# Patient Record
Sex: MALE | Race: WHITE | NOT HISPANIC OR LATINO | Employment: FULL TIME | URBAN - METROPOLITAN AREA
[De-identification: names, ages, dates, MRNs, and addresses within clinical notes are randomized per-mention and may not be internally consistent; named-entity substitution may affect disease eponyms.]

---

## 2018-08-13 ENCOUNTER — HOSPITAL ENCOUNTER (EMERGENCY)
Facility: CLINIC | Age: 48
Discharge: HOME | End: 2018-08-13
Attending: EMERGENCY MEDICINE | Admitting: EMERGENCY MEDICINE
Payer: COMMERCIAL

## 2018-08-13 VITALS
SYSTOLIC BLOOD PRESSURE: 130 MMHG | OXYGEN SATURATION: 95 % | WEIGHT: 175 LBS | DIASTOLIC BLOOD PRESSURE: 73 MMHG | RESPIRATION RATE: 13 BRPM | TEMPERATURE: 98.3 F

## 2018-08-13 DIAGNOSIS — I49.1 PAC (PREMATURE ATRIAL CONTRACTION): ICD-10-CM

## 2018-08-13 DIAGNOSIS — I49.3 PVC'S (PREMATURE VENTRICULAR CONTRACTIONS): ICD-10-CM

## 2018-08-13 LAB
ANION GAP SERPL CALCULATED.3IONS-SCNC: 6 MMOL/L (ref 3–14)
BASOPHILS # BLD AUTO: 0 10E9/L (ref 0–0.2)
BASOPHILS NFR BLD AUTO: 0.5 %
BUN SERPL-MCNC: 18 MG/DL (ref 7–30)
CALCIUM SERPL-MCNC: 8.9 MG/DL (ref 8.5–10.1)
CHLORIDE SERPL-SCNC: 104 MMOL/L (ref 94–109)
CO2 SERPL-SCNC: 28 MMOL/L (ref 20–32)
CREAT SERPL-MCNC: 0.99 MG/DL (ref 0.66–1.25)
DIFFERENTIAL METHOD BLD: NORMAL
EOSINOPHIL # BLD AUTO: 0.1 10E9/L (ref 0–0.7)
EOSINOPHIL NFR BLD AUTO: 0.8 %
ERYTHROCYTE [DISTWIDTH] IN BLOOD BY AUTOMATED COUNT: 11.6 % (ref 10–15)
GFR SERPL CREATININE-BSD FRML MDRD: 81 ML/MIN/1.7M2
GLUCOSE SERPL-MCNC: 99 MG/DL (ref 70–99)
HCT VFR BLD AUTO: 48.4 % (ref 40–53)
HGB BLD-MCNC: 16.5 G/DL (ref 13.3–17.7)
IMM GRANULOCYTES # BLD: 0 10E9/L (ref 0–0.4)
IMM GRANULOCYTES NFR BLD: 0.2 %
INTERPRETATION ECG - MUSE: NORMAL
LYMPHOCYTES # BLD AUTO: 1.6 10E9/L (ref 0.8–5.3)
LYMPHOCYTES NFR BLD AUTO: 24.7 %
MAGNESIUM SERPL-MCNC: 2.4 MG/DL (ref 1.6–2.3)
MCH RBC QN AUTO: 29.9 PG (ref 26.5–33)
MCHC RBC AUTO-ENTMCNC: 34.1 G/DL (ref 31.5–36.5)
MCV RBC AUTO: 88 FL (ref 78–100)
MONOCYTES # BLD AUTO: 0.8 10E9/L (ref 0–1.3)
MONOCYTES NFR BLD AUTO: 12.3 %
NEUTROPHILS # BLD AUTO: 3.9 10E9/L (ref 1.6–8.3)
NEUTROPHILS NFR BLD AUTO: 61.5 %
NRBC # BLD AUTO: 0 10*3/UL
NRBC BLD AUTO-RTO: 0 /100
PLATELET # BLD AUTO: 163 10E9/L (ref 150–450)
POTASSIUM SERPL-SCNC: 3.7 MMOL/L (ref 3.4–5.3)
RBC # BLD AUTO: 5.52 10E12/L (ref 4.4–5.9)
SODIUM SERPL-SCNC: 138 MMOL/L (ref 133–144)
TSH SERPL DL<=0.005 MIU/L-ACNC: 2.5 MU/L (ref 0.4–4)
WBC # BLD AUTO: 6.4 10E9/L (ref 4–11)

## 2018-08-13 PROCEDURE — 93005 ELECTROCARDIOGRAM TRACING: CPT

## 2018-08-13 PROCEDURE — 25000128 H RX IP 250 OP 636: Performed by: EMERGENCY MEDICINE

## 2018-08-13 PROCEDURE — 83735 ASSAY OF MAGNESIUM: CPT | Performed by: EMERGENCY MEDICINE

## 2018-08-13 PROCEDURE — 99284 EMERGENCY DEPT VISIT MOD MDM: CPT | Mod: 25

## 2018-08-13 PROCEDURE — 84443 ASSAY THYROID STIM HORMONE: CPT | Performed by: EMERGENCY MEDICINE

## 2018-08-13 PROCEDURE — 80048 BASIC METABOLIC PNL TOTAL CA: CPT | Performed by: EMERGENCY MEDICINE

## 2018-08-13 PROCEDURE — 85025 COMPLETE CBC W/AUTO DIFF WBC: CPT | Performed by: EMERGENCY MEDICINE

## 2018-08-13 PROCEDURE — 96360 HYDRATION IV INFUSION INIT: CPT

## 2018-08-13 RX ADMIN — SODIUM CHLORIDE 1000 ML: 9 INJECTION, SOLUTION INTRAVENOUS at 05:13

## 2018-08-13 ASSESSMENT — ENCOUNTER SYMPTOMS
LIGHT-HEADEDNESS: 1
DIAPHORESIS: 1
PALPITATIONS: 1
FATIGUE: 1
SHORTNESS OF BREATH: 1
ABDOMINAL PAIN: 0

## 2018-08-13 NOTE — ED PROVIDER NOTES
History     Chief Complaint:  Palpitations       HPI   Gordy Collado is a 48 year old male who presents with palpitations. The patient states that last night at 1900 he started having palpitations described as his heart skipping some beats. He also had a pressure sensation in his chest that occurred with the irregular beats. The patient notes that the symptoms have been intermittent since and he called EMS at 0300. At that time, his symptoms improved, however, they returned and he notes that he last felt them on his way to the ED. The patient notes that during the episodes he also feels light headed, short of breath and diaphoretic. In the past, he has had similar episodes, however, they normally only last for short periods. The patient notes that he does normally go to the gym and does cardio and strength training 5 days a week. The last few times he went to the gym, he notes that he perhaps felt mildly fatigued.      Allergies:  No known drug allergies.     Medications:    Aspirin  Atenolol  Celexa     Past Medical History:    History reviewed. No pertinent past medical history.    Past Surgical History:    Shoulder surgery     Family History:    History reviewed. No pertinent family history.     Social History:  Marital Status: Single  Presents to the ED alone   Tobacco Use: Negative  Alcohol Use: Negative    Review of Systems   Constitutional: Positive for diaphoresis and fatigue.   Respiratory: Positive for shortness of breath.    Cardiovascular: Positive for chest pain and palpitations.   Gastrointestinal: Negative for abdominal pain.   Neurological: Positive for light-headedness.   All other systems reviewed and are negative.    Physical Exam   First Vitals:  BP: 142/79  Heart Rate: 55  Temp: 98.3  F (36.8  C)  Resp: 16  Weight: 79.4 kg (175 lb)  SpO2: 100 %      Physical Exam  Constitutional: Well developed, nontox appearance  Head: Atraumatic.   Mouth/Throat: Oropharynx is clear and moist.   Neck:  no  stridor  Eyes: no scleral icterus  Cardiovascular: Reg bradycardia, 2+ bilat radial pulses  Pulmonary/Chest: nml resp effort, Clear BS bilat  Abdominal: ND, +BS, soft, NT, no rebound or guarding   Ext: Warm, well perfused, no edema  Neurological: A&O, symmetric facies, moves ext x4  Skin: Skin is warm and dry.   Psychiatric: Behavior is normal. Thought content normal.   Nursing note and vitals reviewed.        Emergency Department Course   ECG:  @ 0446  Indication: Palpitations   Vent. Rate 58 bpm. IA interval 164 ms. QRS duration 94 ms. QT/QTc 414/406 ms. P-R-T axis 64 77 65.   Sinus bradycardia with premature atrial complexes. Otherwise normal ECG.   Read @ 0450 by Dr. Santo.     Laboratory:  Magnesium: 2.4 (H)   TSH: 2.50  BMP: WNL (Creatinine 0.99)   CBC:  WBC 6.4, HGB 16.5, , otherwise WNL     Interventions:  (0513) Normal Saline, 1 liter, IV bolus     Emergency Department Course:  Nursing notes and vitals reviewed.  (0450) I performed an exam of the patient as documented above.    EKG was done, interpretation as above.   A peripheral IV was established. Blood was drawn from the patient. This was sent for laboratory testing, findings above.    Findings and plan explained to the patient. Patient discharged home with instructions regarding supportive care, medications, and reasons to return. The importance of close follow-up was reviewed.     Impression & Plan      Medical Decision Making:    Gordy Collado is a 48 year old male who presents for evaluation of palpitations.  Initial ECG shows NSR w/ PACs, cardiac monitor shows PACs and PVCs.    Pt is symptomatic during these.  Doubtg SVT, Atrial fibrillation, ventricular arrhythmia.  DDx includes thyroid disease, acute electrolyte abnormality.  Pt denies new meds, caffeine or etoh.  Has had similar episodes in the past for which he has worn an event monitor.  Offered another event monitor which the pt declined.  Labs unremarkable other than minimal  hypermagnesemia.   Pt is stable for DC.     Doubt PE as patient is PERC negative and symptoms are atypical.  Doubt acute coronary syndrome given symptoms and exam.  Recommendations given regarding follow up with primary care doctor and return to the emergency department as needed for new or worsening symptoms.  Counseled on all results, disposition and diagnosis.  Pt understanding and agreeable to plan. Patient discharged in stable condition.        Diagnosis:    ICD-10-CM    1. PAC (premature atrial contraction) I49.1    2. PVC's (premature ventricular contractions) I49.3        Disposition:  discharged to home        IBelinda, am serving as a scribe on 8/13/2018 at 4:50 AM to personally document services performed by Dr. Santo based on my observations and the provider's statements to me.    8/13/2018   [facility] EMERGENCY DEPARTMENT       Iam Santo MD  08/13/18 1704       Iam Santo MD  08/13/18 2731

## 2018-08-13 NOTE — ED AVS SNAPSHOT
[facility] Emergency Department    [address]    [facility] [address]    Phone:  [phone]    Fax:  [phone]                                       Gordy Collado   MRN: [id number]    Department:  [facility] Emergency Department   Date of Visit:  8/13/2018           After Visit Summary Signature Page     I have received my discharge instructions, and my questions have been answered. I have discussed any challenges I see with this plan with the nurse or doctor.    ..........................................................................................................................................  Patient/Patient Representative Signature      ..........................................................................................................................................  Patient Representative Print Name and Relationship to Patient    ..................................................               ................................................  Date                                            Time    ..........................................................................................................................................  Reviewed by Signature/Title    ...................................................              ..............................................  Date                                                            Time

## 2018-08-13 NOTE — DISCHARGE INSTRUCTIONS
Discharge Instructions  Palpitations    Palpitations are an unusual awareness of your heartbeat. People often describe this as the heart skipping, fluttering, racing, irregular, or pounding. At this time, your provider has found no signs that your palpitations are due to a serious or life-threatening condition. However, sometimes there is a serious problem that does not show up right away.    Palpitations can be caused by caffeine, cigarettes, diet pills, energy drinks or supplements, other stimulants, and medications and street drugs. They can also be caused by anxiety, hormone conditions such as high thyroid, and other medical conditions. Sometimes they are a sign of abnormal rhythm in the heart. At this time, your provider did not find any dangerous cause of your symptoms.    Generally, every Emergency Department visit should have a follow-up clinic visit with either a primary or a specialty clinic/provider. Please follow-up as instructed by your emergency provider today.    Return to the Emergency Department if:    You get chest pain or tightness.    You are short of breath.    You get very weak or tired.    You pass out or faint.    Your heart rate is over 120 beats per minute for more than 10 minutes while you are resting.     You have anything else that worries you.    What can I do to help myself?    Fill any prescriptions the provider gave you and take them right away.     Follow your provider s instructions about the prescription medicines you are on. Sometimes the provider may tell you to stop taking a medicine or change the dose.    If you smoke, this may be a good time to quit! The less you can smoke, the better.    Do not use energy drinks, diet pills, or stimulants. Limit your use of caffeine.  If you were given a prescription for medicine here today, be sure to read all of the information (including the package insert) that comes with your prescription.  This will include important information  about the medicine, its side effects, and any warnings that you need to know about.  The pharmacist who fills the prescription can provide more information and answer questions you may have about the medicine.  If you have questions or concerns that the pharmacist cannot address, please call or return to the Emergency Department.     Remember that you can always come back to the Emergency Department if you are not able to see your regular provider in the amount of time listed above, if you get any new symptoms, or if there is anything that worries you.

## 2018-08-13 NOTE — ED TRIAGE NOTES
Alert and oriented x 3 airway,breathing and circulation intact, having bouts of irregular heartbeat and chest discomfort, hx of artrial fib

## 2018-08-13 NOTE — ED AVS SNAPSHOT
[facility] Emergency Department    [address]    [facility] [address]    Phone:  [phone]    Fax:  [phone]                                       Gordy Collado   MRN: [id number]    Department:  [facility] Emergency Department   Date of Visit:  8/13/2018           Patient Information     Date Of Birth          1970        Your diagnoses for this visit were:     PAC (premature atrial contraction)     PVC's (premature ventricular contractions)        You were seen by Iam Santo MD.      Follow-up Information     Follow up with [facility] Emergency Department.    Specialty:  EMERGENCY MEDICINE    Why:  As needed    Contact information:    [address] [facility] [address] [phone]        Discharge Instructions             Discharge Instructions  Palpitations    Palpitations are an unusual awareness of your heartbeat. People often describe this as the heart skipping, fluttering, racing, irregular, or pounding. At this time, your provider has found no signs that your palpitations are due to a serious or life-threatening condition. However, sometimes there is a serious problem that does not show up right away.    Palpitations can be caused by caffeine, cigarettes, diet pills, energy drinks or supplements, other stimulants, and medications and street drugs. They can also be caused by anxiety, hormone conditions such as high thyroid, and other medical conditions. Sometimes they are a sign of abnormal rhythm in the heart. At this time, your provider did not find any dangerous cause of your symptoms.    Generally, every Emergency Department visit should have a follow-up clinic visit with either a primary or a specialty clinic/provider. Please follow-up as instructed by your emergency provider today.    Return to the Emergency Department if:    You get chest pain or tightness.    You are short of breath.    You get very  weak or tired.    You pass out or faint.    Your heart rate is over 120 beats per minute for more than 10 minutes while you are resting.     You have anything else that worries you.    What can I do to help myself?    Fill any prescriptions the provider gave you and take them right away.     Follow your provider s instructions about the prescription medicines you are on. Sometimes the provider may tell you to stop taking a medicine or change the dose.    If you smoke, this may be a good time to quit! The less you can smoke, the better.    Do not use energy drinks, diet pills, or stimulants. Limit your use of caffeine.  If you were given a prescription for medicine here today, be sure to read all of the information (including the package insert) that comes with your prescription.  This will include important information about the medicine, its side effects, and any warnings that you need to know about.  The pharmacist who fills the prescription can provide more information and answer questions you may have about the medicine.  If you have questions or concerns that the pharmacist cannot address, please call or return to the Emergency Department.     Remember that you can always come back to the Emergency Department if you are not able to see your regular provider in the amount of time listed above, if you get any new symptoms, or if there is anything that worries you.      24 Hour Appointment Hotline       To make an appointment at any [facility], call [phone] ([phone]). If you don't have a family doctor or clinic, we will help you find one. [facility] are conveniently located to serve the needs of you and your family.             Review of your medicines      Our records show that you are taking the medicines listed below. If these are incorrect, please call your family doctor or clinic.        Dose / Directions Last dose taken    ASPIRIN PO   Dose:  81 mg        Take 81 mg by mouth    Refills:  0        ATENOLOL PO        Refills:  0        CELEXA PO        Refills:  0                Procedures and tests performed during your visit     Basic metabolic panel    CBC with platelets differential    EKG 12 lead    Magnesium    TSH with free T4 reflex      Orders Needing Specimen Collection     None      Pending Results     Date and Time Order Name Status Description    8/13/2018 0453 EKG 12 lead Preliminary             Pending Culture Results     No orders found from 8/11/2018 to 8/14/2018.            Pending Results Instructions     If you had any lab results that were not finalized at the time of your Discharge, you can call the ED Lab Result RN at [phone]. You will be contacted by this team for any positive Lab results or changes in treatment. The nurses are available 7 days a week from 10A to 6:30P.  You can leave a message 24 hours per day and they will return your call.        Test Results From Your Hospital Stay        8/13/2018  5:25 AM      Component Results     Component Value Ref Range & Units Status    WBC 6.4 4.0 - 11.0 10e9/L Final    RBC Count 5.52 4.4 - 5.9 10e12/L Final    Hemoglobin 16.5 13.3 - 17.7 g/dL Final    Hematocrit 48.4 40.0 - 53.0 % Final    MCV 88 78 - 100 fl Final    MCH 29.9 26.5 - 33.0 pg Final    MCHC 34.1 31.5 - 36.5 g/dL Final    RDW 11.6 10.0 - 15.0 % Final    Platelet Count 163 150 - 450 10e9/L Final    Diff Method Automated Method  Final    % Neutrophils 61.5 % Final    % Lymphocytes 24.7 % Final    % Monocytes 12.3 % Final    % Eosinophils 0.8 % Final    % Basophils 0.5 % Final    % Immature Granulocytes 0.2 % Final    Nucleated RBCs 0 0 /100 Final    Absolute Neutrophil 3.9 1.6 - 8.3 10e9/L Final    Absolute Lymphocytes 1.6 0.8 - 5.3 10e9/L Final    Absolute Monocytes 0.8 0.0 - 1.3 10e9/L Final    Absolute Eosinophils 0.1 0.0 - 0.7 10e9/L Final    Absolute Basophils 0.0 0.0 - 0.2 10e9/L Final    Abs Immature Granulocytes 0.0 0 - 0.4 10e9/L Final     Absolute Nucleated RBC 0.0  Final         8/13/2018  5:45 AM      Component Results     Component Value Ref Range & Units Status    Sodium 138 133 - 144 mmol/L Final    Potassium 3.7 3.4 - 5.3 mmol/L Final    Chloride 104 94 - 109 mmol/L Final    Carbon Dioxide 28 20 - 32 mmol/L Final    Anion Gap 6 3 - 14 mmol/L Final    Glucose 99 70 - 99 mg/dL Final    Urea Nitrogen 18 7 - 30 mg/dL Final    Creatinine 0.99 0.66 - 1.25 mg/dL Final    GFR Estimate 81 >60 mL/min/1.7m2 Final    Non  GFR Calc    GFR Estimate If Black >90 >60 mL/min/1.7m2 Final    African American GFR Calc    Calcium 8.9 8.5 - 10.1 mg/dL Final         8/13/2018  5:52 AM      Component Results     Component Value Ref Range & Units Status    TSH 2.50 0.40 - 4.00 mU/L Final         8/13/2018  5:45 AM      Component Results     Component Value Ref Range & Units Status    Magnesium 2.4 (H) 1.6 - 2.3 mg/dL Final                Clinical Quality Measure: Blood Pressure Screening     Your blood pressure was checked while you were in the emergency department today. The last reading we obtained was  BP: 142/79 . Please read the guidelines below about what these numbers mean and what you should do about them.  If your systolic blood pressure (the top number) is less than 120 and your diastolic blood pressure (the bottom number) is less than 80, then your blood pressure is normal. There is nothing more that you need to do about it.  If your systolic blood pressure (the top number) is 120-139 or your diastolic blood pressure (the bottom number) is 80-89, your blood pressure may be higher than it should be. You should have your blood pressure rechecked within a year by a primary care provider.  If your systolic blood pressure (the top number) is 140 or greater or your diastolic blood pressure (the bottom number) is 90 or greater, you may have high blood pressure. High blood pressure is treatable, but if left untreated over time it can put you at risk  "for heart attack, stroke, or kidney failure. You should have your blood pressure rechecked by a primary care provider within the next 4 weeks.  If your provider in the emergency department today gave you specific instructions to follow-up with your doctor or provider even sooner than that, you should follow that instruction and not wait for up to 4 weeks for your follow-up visit.        Thank you for choosing [facility]       Thank you for choosing [facility] for your care. Our goal is always to provide you with excellent care. Hearing back from our patients is one way we can continue to improve our services. Please take a few minutes to complete the written survey that you may receive in the mail after you visit with us. Thank you!        [employer] Information     [employer] lets you send messages to your doctor, view your test results, renew your prescriptions, schedule appointments and more. To sign up, go to [email or web address] . Click on \"Log in\" on the left side of the screen, which will take you to the Welcome page. Then click on \"Sign up Now\" on the right side of the page.     You will be asked to enter the access code listed below, as well as some personal information. Please follow the directions to create your username and password.     Your access code is: [email or web address]  Expires: 2018  6:19 AM     Your access code will  in 90 days. If you need help or a new code, please call your [facility] clinic or [phone].        Care EveryWhere ID     This is your Care EveryWhere ID. This could be used by other organizations to access your [facility] medical records  [id number]        Equal Access to Services     [facility] CenterDARIEL : Hadii nain sweet Sodante, waaxda luqadaha, qaybta kaalmacorby moe . So [facility] [phone].    ATENCIÓN: Si habla español, tiene a kendall disposición servicios gratuitos de asistencia lingüística. Llame al [phone].    We comply with " applicable federal civil rights laws and [place] laws. We do not discriminate on the basis of race, color, national origin, age, disability, sex, sexual orientation, or gender identity.            After Visit Summary       This is your record. Keep this with you and show to your community pharmacist(s) and doctor(s) at your next visit.

## 2024-01-18 ENCOUNTER — APPOINTMENT (OUTPATIENT)
Dept: GENERAL RADIOLOGY | Facility: CLINIC | Age: 54
End: 2024-01-18
Attending: EMERGENCY MEDICINE
Payer: COMMERCIAL

## 2024-01-18 ENCOUNTER — HOSPITAL ENCOUNTER (EMERGENCY)
Facility: CLINIC | Age: 54
Discharge: HOME | End: 2024-01-18
Attending: EMERGENCY MEDICINE | Admitting: EMERGENCY MEDICINE
Payer: COMMERCIAL

## 2024-01-18 VITALS
OXYGEN SATURATION: 100 % | RESPIRATION RATE: 11 BRPM | WEIGHT: 170 LBS | HEART RATE: 69 BPM | DIASTOLIC BLOOD PRESSURE: 57 MMHG | BODY MASS INDEX: 21.82 KG/M2 | TEMPERATURE: 97.9 F | SYSTOLIC BLOOD PRESSURE: 128 MMHG | HEIGHT: 74 IN

## 2024-01-18 DIAGNOSIS — R00.2 PALPITATIONS: ICD-10-CM

## 2024-01-18 DIAGNOSIS — R07.9 CHEST PAIN, UNSPECIFIED TYPE: ICD-10-CM

## 2024-01-18 LAB
ANION GAP SERPL CALCULATED.3IONS-SCNC: 10 MMOL/L (ref 7–15)
BASOPHILS # BLD AUTO: 0 10E3/UL (ref 0–0.2)
BASOPHILS NFR BLD AUTO: 0 %
BUN SERPL-MCNC: 27.7 MG/DL (ref 6–20)
CALCIUM SERPL-MCNC: 9.1 MG/DL (ref 8.6–10)
CHLORIDE SERPL-SCNC: 101 MMOL/L (ref 98–107)
CREAT SERPL-MCNC: 1.01 MG/DL (ref 0.67–1.17)
D DIMER PPP FEU-MCNC: <0.27 UG/ML FEU (ref 0–0.5)
DEPRECATED HCO3 PLAS-SCNC: 26 MMOL/L (ref 22–29)
EGFRCR SERPLBLD CKD-EPI 2021: 89 ML/MIN/1.73M2
EOSINOPHIL # BLD AUTO: 0.1 10E3/UL (ref 0–0.7)
EOSINOPHIL NFR BLD AUTO: 2 %
ERYTHROCYTE [DISTWIDTH] IN BLOOD BY AUTOMATED COUNT: 11.8 % (ref 10–15)
GLUCOSE SERPL-MCNC: 151 MG/DL (ref 70–99)
HCT VFR BLD AUTO: 43.4 % (ref 40–53)
HGB BLD-MCNC: 14.4 G/DL (ref 13.3–17.7)
IMM GRANULOCYTES # BLD: 0 10E3/UL
IMM GRANULOCYTES NFR BLD: 0 %
LYMPHOCYTES # BLD AUTO: 1.6 10E3/UL (ref 0.8–5.3)
LYMPHOCYTES NFR BLD AUTO: 30 %
MCH RBC QN AUTO: 29.4 PG (ref 26.5–33)
MCHC RBC AUTO-ENTMCNC: 33.2 G/DL (ref 31.5–36.5)
MCV RBC AUTO: 89 FL (ref 78–100)
MONOCYTES # BLD AUTO: 0.6 10E3/UL (ref 0–1.3)
MONOCYTES NFR BLD AUTO: 11 %
NEUTROPHILS # BLD AUTO: 3.2 10E3/UL (ref 1.6–8.3)
NEUTROPHILS NFR BLD AUTO: 57 %
NRBC # BLD AUTO: 0 10E3/UL
NRBC BLD AUTO-RTO: 0 /100
PLATELET # BLD AUTO: 156 10E3/UL (ref 150–450)
POTASSIUM SERPL-SCNC: 4.2 MMOL/L (ref 3.4–5.3)
RBC # BLD AUTO: 4.89 10E6/UL (ref 4.4–5.9)
SODIUM SERPL-SCNC: 137 MMOL/L (ref 135–145)
TROPONIN T SERPL HS-MCNC: 14 NG/L
TROPONIN T SERPL HS-MCNC: 9 NG/L
WBC # BLD AUTO: 5.6 10E3/UL (ref 4–11)

## 2024-01-18 PROCEDURE — 99285 EMERGENCY DEPT VISIT HI MDM: CPT | Mod: 25

## 2024-01-18 PROCEDURE — 80048 BASIC METABOLIC PNL TOTAL CA: CPT | Performed by: EMERGENCY MEDICINE

## 2024-01-18 PROCEDURE — 71046 X-RAY EXAM CHEST 2 VIEWS: CPT

## 2024-01-18 PROCEDURE — 93005 ELECTROCARDIOGRAM TRACING: CPT

## 2024-01-18 PROCEDURE — 85048 AUTOMATED LEUKOCYTE COUNT: CPT | Performed by: EMERGENCY MEDICINE

## 2024-01-18 PROCEDURE — 84484 ASSAY OF TROPONIN QUANT: CPT | Performed by: EMERGENCY MEDICINE

## 2024-01-18 PROCEDURE — 36415 COLL VENOUS BLD VENIPUNCTURE: CPT | Performed by: EMERGENCY MEDICINE

## 2024-01-18 PROCEDURE — 85379 FIBRIN DEGRADATION QUANT: CPT | Performed by: EMERGENCY MEDICINE

## 2024-01-18 ASSESSMENT — ACTIVITIES OF DAILY LIVING (ADL)
ADLS_ACUITY_SCORE: 35
ADLS_ACUITY_SCORE: 35

## 2024-01-18 NOTE — ED TRIAGE NOTES
Patient states he is getting frequent PVCs and chest pain started. Has had similar in the past, has been treated with medications. Mild SOB

## 2024-01-18 NOTE — ED PROVIDER NOTES
History     Chief Complaint:  Chest pain, palpitations       HPI   Gordy Collado is a 53 year old male with history of paroxysmal atrial fibrillation and PVCs who presents for evaluation of palpitations and chest discomfort.  Patient states that he occasionally feels palpitations that he attributes to PVCs, but was feeling them at an increased frequency today.  In addition to the palpitations, the patient noted a dull chest discomfort.  He denies shortness of breath, pleuritic discomfort, cough, fever, or any other concerns.    Independent Historian:   None    Review of External Notes: Reviewed 10/12/2023 office visit    ROS:  Review of Systems    Allergies:  No Known Allergies     Medications:    ASPIRIN PO  ATENOLOL PO  Citalopram Hydrobromide (CELEXA PO)        Past History:    Past Medical History:   Diagnosis Date    Atrial fibrillation (H)     Sleep apnea          Physical Exam     Patient Vitals for the past 24 hrs:   BP Temp Temp src Pulse Resp SpO2   05/12/23 0247 113/73 97.8  F (36.6  C) Temporal 95 20 98 %        Physical Exam  Constitutional: Alert, attentive  HENT:    Nose: Nose normal.    Mouth/Throat: Oropharynx is clear, mucous membranes are moist   Eyes: EOM are normal.   CV: regular rate and rhythm; no murmurs, rubs or gallups  Chest: Effort normal and breath sounds normal.   GI:  There is no tenderness. No distension. Normal bowel sounds  MSK: Normal range of motion.   Neurological: Alert, attentive  Skin: Skin is warm and dry.      Emergency Department Course       Results for orders placed or performed during the hospital encounter of 01/18/24   XR Chest 2 Views     Status: None    Narrative    CHEST TWO VIEWS  1/18/2024 3:55 PM     HISTORY: Chest pain.    COMPARISON: None.       Impression    IMPRESSION:  There are no acute infiltrates. The cardiac silhouette is  not enlarged. Pulmonary vasculature is unremarkable.     MCKAYLA MILLER MD         SYSTEM ID:  [id number]   Basic metabolic panel  (BMP)     Status: Abnormal   Result Value Ref Range    Sodium 137 135 - 145 mmol/L    Potassium 4.2 3.4 - 5.3 mmol/L    Chloride 101 98 - 107 mmol/L    Carbon Dioxide (CO2) 26 22 - 29 mmol/L    Anion Gap 10 7 - 15 mmol/L    Urea Nitrogen 27.7 (H) 6.0 - 20.0 mg/dL    Creatinine 1.01 0.67 - 1.17 mg/dL    GFR Estimate 89 >60 mL/min/1.73m2    Calcium 9.1 8.6 - 10.0 mg/dL    Glucose 151 (H) 70 - 99 mg/dL   Troponin T, High Sensitivity (now)     Status: Normal   Result Value Ref Range    Troponin T, High Sensitivity 9 <=22 ng/L   D dimer quantitative     Status: Normal   Result Value Ref Range    D-Dimer Quantitative <0.27 0.00 - 0.50 ug/mL FEU    Narrative    This D-dimer assay is intended for use in conjunction with a clinical pretest probability assessment model to exclude pulmonary embolism (PE) and deep venous thrombosis (DVT) in outpatients suspected of PE or DVT. The cut-off value is 0.50 ug/mL FEU.    For patients 50 years of age or older, the application of age-adjusted cut-off values for D-Dimer may increase the specificity without significant effect on sensitivity. The literature suggested calculation age adjusted cut-off in ug/L = age in years x 10 ug/L. The results in this laboratory are reported as ug/mL rather than ug/L. The calculation for age adjusted cut off in ug/mL= age in years x 0.01 ug/mL. For example, the cut off for a 76 year old male is 76 x 0.01 ug/mL = 0.76 ug/mL (760 ug/L).    M Becky et al. Age adjusted D-dimer cut-off levels to rule out pulmonary embolism: The ADJUST-PE Study. SHELBI 2014;311:1423-2276.; TENA Aquino et al. Diagnostic accuracy of conventional or age adjusted D-dimer cutoff values in older patients with suspected venous thromboembolism. Systemic review and meta-analysis. BMJ 2013:346:f2492.   CBC with platelets and differential     Status: None   Result Value Ref Range    WBC Count 5.6 4.0 - 11.0 10e3/uL    RBC Count 4.89 4.40 - 5.90 10e6/uL    Hemoglobin 14.4 13.3 - 17.7 g/dL     Hematocrit 43.4 40.0 - 53.0 %    MCV 89 78 - 100 fL    MCH 29.4 26.5 - 33.0 pg    MCHC 33.2 31.5 - 36.5 g/dL    RDW 11.8 10.0 - 15.0 %    Platelet Count 156 150 - 450 10e3/uL    % Neutrophils 57 %    % Lymphocytes 30 %    % Monocytes 11 %    % Eosinophils 2 %    % Basophils 0 %    % Immature Granulocytes 0 %    NRBCs per 100 WBC 0 <1 /100    Absolute Neutrophils 3.2 1.6 - 8.3 10e3/uL    Absolute Lymphocytes 1.6 0.8 - 5.3 10e3/uL    Absolute Monocytes 0.6 0.0 - 1.3 10e3/uL    Absolute Eosinophils 0.1 0.0 - 0.7 10e3/uL    Absolute Basophils 0.0 0.0 - 0.2 10e3/uL    Absolute Immature Granulocytes 0.0 <=0.4 10e3/uL    Absolute NRBCs 0.0 10e3/uL   EKG 12 lead     Status: None (Preliminary result)   Result Value Ref Range    Systolic Blood Pressure  mmHg    Diastolic Blood Pressure  mmHg    Ventricular Rate 72 BPM    Atrial Rate 72 BPM    VA Interval 178 ms    QRS Duration 94 ms     ms    QTc 431 ms    P Axis 69 degrees    R AXIS 74 degrees    T Axis 63 degrees    Interpretation ECG       Sinus rhythm  Normal ECG  When compared with ECG of 13-AUG-2018 04:46,  Premature atrial complexes are no longer Present     CBC + differential     Status: None    Narrative    The following orders were created for panel order CBC + differential.  Procedure                               Abnormality         Status                     ---------                               -----------         ------                     CBC with platelets and d...[[id number]]                      Final result                 Please view results for these tests on the individual orders.       Emergency Department Course & Assessments:           Independent Interpretation (X-rays, CTs, rhythm strip):  No wide mediastinum on chest x-ray      Disposition:  The patient was discharged to home.     Impression & Plan      Medical Decision Making:  This is a 53-year-old male with history of PVCs and paroxysmal A-fib who presents for ration of palpitations  and chest pain.  D-dimer is negative, since ruling out PE.  There is no hematologic or metabolic abnormality to explain his palpitation symptoms, and no abnormalities noted on EKG or monitoring.  His EKG is nonischemic and delta 2-hour troponins are negative, essentially ruling out AMI.  On recheck, the patient's symptoms are resolved.  Given his low  HEART score, he is a reasonable candidate for outpatient follow-up.  Plan primary care follow-up for recheck in 1 week.  Return precautions for chest pain, shortness of breath, or any other concerns.        Diagnosis:  Visit Diagnosis, Associated Orders, and Comments     ICD-10-CM    1. Chest pain, unspecified type  R07.9       2. Palpitations  R00.2              Ady Pham MD  01/18/24 1858

## 2024-01-19 ENCOUNTER — HOSPITAL ENCOUNTER (EMERGENCY)
Facility: CLINIC | Age: 54
Discharge: HOME | End: 2024-01-19
Attending: EMERGENCY MEDICINE | Admitting: EMERGENCY MEDICINE
Payer: COMMERCIAL

## 2024-01-19 VITALS
BODY MASS INDEX: 21.82 KG/M2 | OXYGEN SATURATION: 96 % | SYSTOLIC BLOOD PRESSURE: 121 MMHG | TEMPERATURE: 96 F | DIASTOLIC BLOOD PRESSURE: 71 MMHG | HEIGHT: 74 IN | WEIGHT: 170 LBS | RESPIRATION RATE: 12 BRPM | HEART RATE: 57 BPM

## 2024-01-19 DIAGNOSIS — I49.3 SYMPTOMATIC PVCS: ICD-10-CM

## 2024-01-19 LAB
ATRIAL RATE - MUSE: 61 BPM
ATRIAL RATE - MUSE: 72 BPM
ATRIAL RATE - MUSE: 72 BPM
DIASTOLIC BLOOD PRESSURE - MUSE: NORMAL MMHG
INTERPRETATION ECG - MUSE: NORMAL
P AXIS - MUSE: 30 DEGREES
P AXIS - MUSE: 65 DEGREES
P AXIS - MUSE: 69 DEGREES
PR INTERVAL - MUSE: 116 MS
PR INTERVAL - MUSE: 174 MS
PR INTERVAL - MUSE: 178 MS
QRS DURATION - MUSE: 92 MS
QRS DURATION - MUSE: 94 MS
QRS DURATION - MUSE: 94 MS
QT - MUSE: 394 MS
QT - MUSE: 426 MS
QT - MUSE: 428 MS
QTC - MUSE: 428 MS
QTC - MUSE: 431 MS
QTC - MUSE: 468 MS
R AXIS - MUSE: 41 DEGREES
R AXIS - MUSE: 73 DEGREES
R AXIS - MUSE: 74 DEGREES
SYSTOLIC BLOOD PRESSURE - MUSE: NORMAL MMHG
T AXIS - MUSE: 39 DEGREES
T AXIS - MUSE: 53 DEGREES
T AXIS - MUSE: 63 DEGREES
TROPONIN T SERPL HS-MCNC: 13 NG/L
TSH SERPL DL<=0.005 MIU/L-ACNC: 2.69 UIU/ML (ref 0.3–4.2)
VENTRICULAR RATE- MUSE: 61 BPM
VENTRICULAR RATE- MUSE: 72 BPM
VENTRICULAR RATE- MUSE: 72 BPM

## 2024-01-19 PROCEDURE — 36415 COLL VENOUS BLD VENIPUNCTURE: CPT | Performed by: EMERGENCY MEDICINE

## 2024-01-19 PROCEDURE — 93005 ELECTROCARDIOGRAM TRACING: CPT | Mod: 76

## 2024-01-19 PROCEDURE — 93005 ELECTROCARDIOGRAM TRACING: CPT

## 2024-01-19 PROCEDURE — 84443 ASSAY THYROID STIM HORMONE: CPT | Performed by: EMERGENCY MEDICINE

## 2024-01-19 PROCEDURE — 84484 ASSAY OF TROPONIN QUANT: CPT | Performed by: EMERGENCY MEDICINE

## 2024-01-19 PROCEDURE — 99284 EMERGENCY DEPT VISIT MOD MDM: CPT

## 2024-01-19 ASSESSMENT — ACTIVITIES OF DAILY LIVING (ADL): ADLS_ACUITY_SCORE: 35

## 2024-01-19 NOTE — ED TRIAGE NOTES
Pt seen in the ER yesterday for irregular heart beat. Pt discharged at 1830. Pt reports the irregular heart beat started again 30 min ago pt describes it as a fullness

## 2024-01-19 NOTE — ED PROVIDER NOTES
History     Chief Complaint:  Irregular Heart Beat    HPI   Gordy Collado is a 53 year old male who presents with PVCs. The patient notes experiencing chest pressure and discomfort with the PVCs. He has had PVCs in the past. The patient denies any recent medication changes, fevers, or drug use.  He was seen earlier in the ED with workup noted below.    Independent Historian:    None     Review of External Notes:  I reviewed Dr. Pham's note from yesterday.      The patient's labs were as follows:  Repeat troponin:  Troponin T, High Sensitivity  <=22 ng/L 14     D-Dimer Quantitative  0.00 - 0.50 ug/mL FEU <0.27     Troponin T, High Sensitivity  <=22 ng/L 9   Basic Metabolic:         Sodium  135 - 145 mmol/L 137 138 R       Potassium  3.4 - 5.3 mmol/L 4.2     Chloride  98 - 107 mmol/L 101     Carbon Dioxide (CO2)  22 - 29 mmol/L 26     Anion Gap  7 - 15 mmol/L 10 6 R    Urea Nitrogen  6.0 - 20.0 mg/dL 27.7 High      Creatinine  0.67 - 1.17 mg/dL 1.01 0.99 R    GFR Estimate  >60 mL/min/1.73m2 89 81 R, CM    Calcium  8.6 - 10.0 mg/dL 9.1 8.9 R    Glucose  70 - 99 mg/dL 151 High     CBC:  WBC Count  4.0 - 11.0 10e3/uL 5.6 6.4 R     RBC Count  4.40 - 5.90 10e6/uL 4.89 5.52 R    Hemoglobin  13.3 - 17.7 g/dL 14.4 16.5    Hematocrit  40.0 - 53.0 % 43.4 48.4    MCV  78 - 100 fL 89 88 R    MCH  26.5 - 33.0 pg 29.4 29.9    MCHC  31.5 - 36.5 g/dL 33.2 34.1    RDW  10.0 - 15.0 % 11.8 11.6    Platelet Count  150 - 450 10e3/uL 156 163 R    % Neutrophils  % 57 61.5    % Lymphocytes  % 30 24.7    % Monocytes  % 11 12.3    % Eosinophils  % 2 0.8    % Basophils  % 0 0.5    % Immature Granulocytes  % 0     NRBCs per 100 WBC  <1 /100 0     Absolute Neutrophils  1.6 - 8.3 10e3/uL 3.2     Absolute Lymphocytes  0.8 - 5.3 10e3/uL 1.6     Absolute Monocytes  0.0 - 1.3 10e3/uL 0.6     Absolute Eosinophils  0.0 - 0.7 10e3/uL 0.1     Absolute Basophils  0.0 - 0.2 10e3/uL 0.0     Absolute Immature Granulocytes  <=0.4 10e3/uL 0.0      "Absolute NRBCs  10e3/uL 0.0      Medications:    Tenormin   Celexa  Aspirin 81 mg    Past Medical History:    Anxiety  Afib  TY  Peyronie's disease  Arthritis  Depression   Sleep apnea     Past Surgical History:    Arthroscopy knee, left  Fibula fracture surgery, right  Shoulder surgery, right     Physical Exam   Patient Vitals for the past 24 hrs:   BP Temp Temp src Pulse Resp SpO2 Height Weight   01/19/24 0300 127/73 -- -- 63 12 96 % -- --   01/19/24 0206 142/84 -- -- 54 9 -- -- --   01/19/24 0157 142/77 (!) 96  F (35.6  C) Temporal 63 18 100 % 1.88 m (6' 2\") 77.1 kg (170 lb)      Physical Exam  Nursing note and vitals reviewed.  Constitutional: Cooperative.  Sitting up comfortably  HENT:   Mouth/Throat: Mucous membranes are normal.   Cardiovascular: Normal rate, regular rhythm and normal heart sounds.  No murmur.  Pulmonary/Chest: Effort normal and breath sounds normal. No respiratory distress. No wheezes. No rales.   Abdominal: Soft. Normal appearance. There is no tenderness.   Neurological: Alert.  Oriented x 3  Skin: Skin is warm and dry.   Psychiatric: Normal mood and affect.      Emergency Department Course   ECG  ECG taken at 0154, ECG read at 0157  Sinus rhythm with frequent premature ventricular complexes in a pattern of bigeminy  Low voltage QRS  Rate 72 bpm. CA interval 116 ms. QRS duration 92 ms. QT/QTc 428/468 ms. P-R-T axes 30 41 39.    ECG:  ECG taken at 0311, ECG read at 0316  Normal sinus rhythm  Normal ECG  Rate 61 bpm. CA interval 174 ms. QRS duration 94 ms. QT/QTc 426/428 ms. P-R-T axes 65 73 53.     Laboratory:  Labs Ordered and Resulted from Time of ED Arrival to Time of ED Departure   TROPONIN T, HIGH SENSITIVITY - Normal       Result Value    Troponin T, High Sensitivity 13     TSH WITH FREE T4 REFLEX - Normal    TSH 2.69         Interventions:  Medications - No data to display     Assessments:  0306 I obtained history and examined the patient as noted above.     Independent Interpretation " (X-rays, CTs, rhythm strip):  None    Consultations/Discussion of Management or Tests:  None     Social Determinants of Health affecting care:  None      Disposition:  The patient was discharged to home.     Impression & Plan      Medical Decision Makin-year-old gentleman who presents with continued symptomatic palpitations.  Initial EKG showed frequent PVCs in a pattern of bigeminy.  Fortunately his troponin remained stable.  Labs from earlier today were reviewed.  No fevers or other concerning respiratory symptoms.  His dimer was noted to be negative earlier today.  On repeat EKG his PVCs have resolved.  No indication this represents myocarditis.  At this time he is stable for discharge with normal perfusing pressors to follow-up with regular clinic as needed    Diagnosis:    ICD-10-CM    1. Symptomatic PVCs  I49.3           Scribe Disclosure:  Renan VERGARA, am serving as a scribe at 2:18 AM on 2024 to document services personally performed by Ady Marie MD based on my observations and the provider's statements to me.               Ady Marie MD  24 [phone]